# Patient Record
(demographics unavailable — no encounter records)

---

## 2024-12-12 NOTE — HISTORY OF PRESENT ILLNESS
[FreeTextEntry6] : patient with dry cough temp 99.4F [EENT/Resp Symptoms] : EENT/RESPIRATORY SYMPTOMS [Nasal congestion] : nasal congestion [Cough] : cough [___ Day(s)] : [unfilled] day(s) [Active] : active [Known Exposure to COVID-19] : no known exposure to COVID-19 [Hx of recent COVID-19 infection] : no history of recent COVID-19 infection [Sick Contacts: ___] : sick contacts: [unfilled] [Dry cough] : dry cough [Fever] : no fever [Wheezing] : no wheezing [Shortness of Breath] : no shortness of breath [Tachypnea] : no tachypnea [Decreased Appetite] : no decreased appetite [Posttussive emesis] : no posttussive emesis [Vomiting] : no vomiting [Diarrhea] : no diarrhea [Decreased Urine Output] : no decreased urine output [Stable] : stable

## 2024-12-12 NOTE — DISCUSSION/SUMMARY
[FreeTextEntry1] : Symptoms likely due to viral URI. Recommend supportive care including antipyretics, fluids, cool mist humidifier, honey.  Return if symptoms worsen or persist.

## 2024-12-13 NOTE — COUNSELING
[Use of Plain Language] : use of plain language [Adequate] : adequate [None] : none [] : I have reviewed management goals with caretaker and provided a copy of care plan N/A

## 2024-12-13 NOTE — DISCUSSION/SUMMARY
[FreeTextEntry1] :  Symptoms of a common cold progressing to a sinus infection. Start the antibiotic for the nasal congestion and cough.  Use albuterol 3 times a day also for the cough. RVP sent out. If cough worsens or starts with fever to return for evaluation.

## 2024-12-13 NOTE — DISCUSSION/SUMMARY
[FreeTextEntry1] :  Symptoms of a common cold progressing to a sinus infection. Start the antibiotic for the nasal congestion and cough.  Use albuterol 3 times a day also for the cough. RVP sent out. If cough worsens or starts with fever to return for evaluation. SOB/shoulder pain/injury

## 2024-12-13 NOTE — HISTORY OF PRESENT ILLNESS
[EENT/Resp Symptoms] : EENT/RESPIRATORY SYMPTOMS [___ Day(s)] : [unfilled] day(s) [Fever] : fever [Nasal Congestion] : nasal congestion [Sore Throat] : sore throat [Cough] : cough [Max Temp: ____] : Max temperature: [unfilled] [Sick Contacts: ___] : sick contacts: [unfilled] [Vomiting] : no vomiting [Diarrhea] : no diarrhea

## 2025-01-24 NOTE — PHYSICAL EXAM
[Alert] : alert [Tired appearing] : tired appearing [Clear Rhinorrhea] : clear rhinorrhea [Soft] : soft [NL] : warm, clear [Acute Distress] : no acute distress [Lethargic] : not lethargic [Toxic] : not toxic [Tender] : nontender [Distended] : nondistended [FreeTextEntry9] : hyperactive bowel sounds, no acute abdomen

## 2025-01-24 NOTE — DISCUSSION/SUMMARY
[FreeTextEntry1] :  Assessment and Plan:   - **Viral Gastroenteritis:** Patient presents with symptoms consistent with viral gastroenteritis, likely norovirus given the current prevalence. Symptoms have persisted beyond the typical 2-3 day period, which can occur in some cases lasting up to two weeks.     - Supportive care: Encourage adequate hydration and rest      - Monitor for worsening symptoms or development of complications      - Educate family on expected course of illness and when to seek further medical attention      - Follow-up if symptoms persist beyond two weeks from onset    - **Upper Respiratory Infection:** Patient has developed symptoms of an upper respiratory infection, possibly rhinovirus, following the gastroenteritis episode. This may be due to a weakened immune system from the recent illness.     - Prescribed Bromfed DM for symptomatic relief of cough and nasal congestion      - Encourage rest and adequate hydration      - Monitor for worsening symptoms or development of fever      - Follow-up if symptoms persist or worsen

## 2025-01-24 NOTE — HISTORY OF PRESENT ILLNESS
[de-identified] : Stomach virus, Stuffy nose and loss stool [FreeTextEntry6] : Anand presents with her mother for follow-up of recent illness. The patient began experiencing symptoms of vomiting and diarrhea last Thursday night, which continued through today. She had vomiting and diarrhea, causing her to miss school last Friday only. While the vomiting has resolved, she continues to have loose stools. Additionally, the patient has developed significant nasal congestion. The mother reports that probiotics were attempted without significant improvement. The patient has had no fever. The illness has caused the patient to appear weak and tired.

## 2025-02-11 NOTE — PHYSICAL EXAM
[NL] : warm, clear [Acute Distress] : no acute distress [Alert] : alert [Tired appearing] : tired appearing [Toxic] : not toxic [Clear Rhinorrhea] : clear rhinorrhea [Wheezing] : no wheezing [Rales] : no rales [Crackles] : no crackles [Rhonchi] : rhonchi [Subcostal Retractions] : no subcostal retractions [Suprasternal Retractions] : no suprasternal retractions

## 2025-02-11 NOTE — HISTORY OF PRESENT ILLNESS
[Vomiting] : no vomiting [Diarrhea] : no diarrhea [Rash] : no rash [FreeTextEntry1] : Yesterday [FreeTextEntry5] : Body ache, chills

## 2025-02-11 NOTE — DISCUSSION/SUMMARY
[FreeTextEntry1] : Objective:   - **Diagnostic Results:** Rapid flu test positive for Influenza A Assessment and Plan:   - **Influenza A:** Patient is diagnosed with Influenza A based on positive rapid flu test and clinical presentation of fever and cough.     - Prescribe Tamiflu (oseltamivir) to reduce viral load and potentially shorten the duration of illness      - Recommend fever control with alternating Tylenol (acetaminophen) and Motrin (ibuprofen)      - Educate parents on the importance of rest and hydration      - Advise on infection control measures to prevent spread to other family members      - Instruct to keep the child home from school until fever-free for 24 hours without medication    - **Acute Bronchitis:** Patient has developed bronchitis with phlegm in the right side of the chest, likely secondary to influenza infection.     - Prescribe Zithromax (azithromycin) once daily for 5 days      - Prescribe albuterol to be used three times a day for 1 week      - Educate parents on proper use of inhaler and importance of completing the full course of antibiotics      - Recommend follow-up in one week if symptoms are not improving      - Advise parents to return sooner if symptoms worsen or new symptoms develop

## 2025-02-11 NOTE — REVIEW OF SYSTEMS
[Negative] : Genitourinary [Fever] : fever [Chills] : chills [Malaise] : malaise [Eye Discharge] : no eye discharge [Ear Pain] : no ear pain [Nasal Discharge] : nasal discharge [Nasal Congestion] : nasal congestion [Sore Throat] : no sore throat [Tachypnea] : not tachypneic [Wheezing] : no wheezing [Cough] : cough [Congestion] : congestion [Shortness of Breath] : no shortness of breath [Rash] : no rash

## 2025-02-26 NOTE — HISTORY OF PRESENT ILLNESS
[Fruit] : fruit [Vegetables] : vegetables [Meat] : meat [Grains] : grains [Fish] : fish [Dairy] : dairy [___ stools per day] : [unfilled]  stools per day [Firm] : stools are firm consistency [Normal] : Normal [In own bed] : In own bed [Yes] : Patient goes to dentist yearly [Playtime (60 min/d)] : Playtime 60 min a day [Child Cooperates] : Child cooperates [Grade ___] : Grade [unfilled] [Adequate performance] : Adequate performance [No] : Not at  exposure [Car seat in back seat] : Car seat in back seat [Supervised outdoor play] : Supervised outdoor play [Delayed] : Delayed [NO] : No

## 2025-02-26 NOTE — DISCUSSION/SUMMARY
[Normal Growth] : growth [Normal Development] : development  [No Elimination Concerns] : elimination [Continue Regimen] : feeding [No Skin Concerns] : skin [Normal Sleep Pattern] : sleep [None] : no medical problems [School Readiness] : school readiness [Mental Health] : mental health [Nutrition and Physical Activity] : nutrition and physical activity [Oral Health] : oral health [Safety] : safety [Anticipatory Guidance Given] : Anticipatory guidance addressed as per the history of present illness section [No Vaccines] : no vaccines needed [No Medications] : ~He/She~ is not on any medications [FreeTextEntry1] : Continue balanced diet with all food groups. Brush teeth twice a day with toothbrush. Recommend visit to dentist. Help child to maintain consistent daily routines and sleep schedule. Personal hygiene and puberty explained. School discussed. Ensure home is safe. Teach child about personal safety. Use consistent, positive discipline. Limit screen time to no more than 2 hours per day. Encourage physical activity. Return 1 year for routine well child check. Missing vaccines, discussed with mom and education provided. Will come back next week for labs titiers and vaccines

## 2025-03-05 NOTE — HISTORY OF PRESENT ILLNESS
[de-identified] : Follow-up bloodwork and vaccines  [FreeTextEntry6] : Pt is here for blood work and vaccines  mother does not want to do vaccines today would like to do mmrv titers, child only has one dose of each will also do hep b titers